# Patient Record
Sex: MALE | Race: WHITE | NOT HISPANIC OR LATINO | Employment: OTHER | URBAN - NONMETROPOLITAN AREA
[De-identification: names, ages, dates, MRNs, and addresses within clinical notes are randomized per-mention and may not be internally consistent; named-entity substitution may affect disease eponyms.]

---

## 2023-12-28 ENCOUNTER — EVALUATION (OUTPATIENT)
Dept: PHYSICAL THERAPY | Facility: CLINIC | Age: 73
End: 2023-12-28
Payer: OTHER GOVERNMENT

## 2023-12-28 DIAGNOSIS — M25.562 LEFT KNEE PAIN: Primary | ICD-10-CM

## 2023-12-28 DIAGNOSIS — Z96.659 TOTAL KNEE REPLACEMENT STATUS: ICD-10-CM

## 2023-12-28 PROCEDURE — 97110 THERAPEUTIC EXERCISES: CPT | Mod: GP

## 2023-12-28 PROCEDURE — 97161 PT EVAL LOW COMPLEX 20 MIN: CPT | Mod: GP

## 2023-12-28 ASSESSMENT — PATIENT HEALTH QUESTIONNAIRE - PHQ9
2. FEELING DOWN, DEPRESSED OR HOPELESS: NOT AT ALL
SUM OF ALL RESPONSES TO PHQ9 QUESTIONS 1 AND 2: 0
1. LITTLE INTEREST OR PLEASURE IN DOING THINGS: NOT AT ALL

## 2023-12-28 ASSESSMENT — PAIN SCALES - GENERAL: PAINLEVEL_OUTOF10: 0 - NO PAIN

## 2023-12-28 ASSESSMENT — ENCOUNTER SYMPTOMS
DEPRESSION: 0
OCCASIONAL FEELINGS OF UNSTEADINESS: 0
LOSS OF SENSATION IN FEET: 0

## 2023-12-28 ASSESSMENT — PAIN - FUNCTIONAL ASSESSMENT: PAIN_FUNCTIONAL_ASSESSMENT: 0-10

## 2023-12-28 NOTE — Clinical Note
December 28, 2023    Raymond Ramos MD  [address] [facility] [address]    Patient: Dorian Gilbert   YOB: 1950   Date of Visit: 12/28/2023       Dear Raymond Ramos Md  [address] [place] [address]    The attached plan of care is being sent to you because your patient’s medical reimbursement requires that you certify the plan of care. Your signature is required to allow uninterrupted insurance coverage.      You may indicate your approval by signing below and faxing this form back to us at Dept Fax: [phone].    Please call Dept: [phone] with any questions or concerns.    Thank you for this referral,        Lizzeth Russo, PT  [address] [address]    Payer: Payor: [facility] / Plan: [facility] / Product Type: *No Product type* /                                                                         Date:     Dear Lizzeth Russo, PT,     Re: Mr. Dorian Gilbert, MRN:[id number]    I certify that I have reviewed the attached plan of care and it is medically necessary for Mr. Dorian Gilbert (1950) who is under my care.          ______________________________________                    _________________  Provider name and credentials                                           Date and time                                                                                           Plan of Care 12/28/23   Effective from: 12/28/2023  Effective to: 2/2/2024    Plan ID: [id number]            Participants as of Finalize on 12/28/2023    Name Type Comments Contact Info    Raymond Ramos MD Referring Provider  [phone]    Lizzeth Russo, PT Physical Therapist  [phone]       Last Plan Note     Author: Lizzeth Russo PT Status: Addendum Last edited: 12/28/2023  9:45 AM       Physical Therapy    Physical Therapy Evaluation and Treatment       Patient Name: Dorian Gilbert  MRN: [id number]  Today's Date: 12/28/2023  Time Calculation  Start Time: 0945  Stop Time: 1024  Time Calculation (min): 39 min    Assessment:    Dorian Gilbert, a 73 y.o. male, arrives to outpatient PT s/p L TKR. Patient presenting with mild deficits in L knee AROM/musculature strength compared to R knee as well as mild functional mobility deficits per LEFS score. 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 Patient would benefit from skilled PT interventions 2x/week for 3 weeks for 6 total visits in POC to address L knee stability and strength. Educated in closed chain L knee flexion stretch as well as hip strengthening to progress home program from home care. HEP handout provided. 0/10 pain at end of session. Thank you for this referral and please call [phone] with any questions or concerns.      Plan:  OP PT Plan  Treatment/Interventions: Education/ Instruction, Therapeutic exercises, Blood flow restriction therapy, Cryotherapy, Gait training, Manual therapy, Taping techniques, Therapeutic activities  PT Plan: Skilled PT  PT Frequency: 2 times per week  Duration: 3 weeks for 6 additional visits in POC; The physical therapist of record is the therapist who assumes primary responsibility for patient management and as such is held accountable for the coordination, continuation and progression of the POC. This patient's care and PT of record will be transferred from Lizzeth Russo PT, DPT to Maria Ines Valdez PT effective as of 12/28/2023  Onset Date: 12/06/23  Certification Period Start Date: 09/05/23  Certification Period End Date: 03/24/24  Number of Treatments Authorized: 15  Rehab Potential: Good  Plan of Care Agreement: Patient    Current Problem:   1. Left knee pain  Follow Up In Physical Therapy      2. Total knee replacement status  Referral to Physical Therapy    Follow Up In Physical Therapy           Subjective    General  Reason for Referral: L TKR  Referred By: Richard WILD  General: Patient had L TKR on 12/6/23. Patient has follow-up with surgeon this afternoon. No current pain. No use of assistive device. Had home care physical therapy for 2 weeks with ROM from 0-116 deg. 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 Patient reports that home care PT believed his stair climbing was amazing. He is currently wearing full length compression stockings and jeans this date to PT. Compliant in HEP for R knee stretches as well as strengthening. He states that with his THR that he did not have PT afterwards. 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    No barriers to care or learning  Medical History Questionnaire reviewed with patient  Precautions:  Precautions  STEADI Fall Risk Score (The score of 4 or more indicates an increased risk of falling): 0  Precautions Comment: L TKR 12/6/2023    Pain:  Pain Assessment  Pain Assessment: 0-10  Pain Score: 0 - No pain  Home Living:   No concerns with home set-up  Prior Level of Function:   Independent in all ADLs/iADLs with no restriction    Objective     HIP  Specific Lower Extremity MMT  L Iliopsoas: (5/5): 5  L Gluteals (prone): (5/5): 4+  L Gluteals (sidelying): (5/5): 4+  L knee flexion: (5/5): 5  L knee extension: (5/5): 5  Gait  Gait Comment: Symmetric gait pattern  Flexability  L hamstrings: No restriction    Knee AROM  R knee flexion: (140°): 125  L knee flexion: (140°): 105 (Can reach 116 deg of flexion after fwd lunge stretch)  R knee extension: (0°): 0  L knee extension: (0°): 0    Outcome Measures:  Other Measures  Lower Extremity Funtional Score (LEFS): 73/80     Treatments:  Therapeutic Exercise  Therapeutic Exercise Performed: Yes  SLR x10  Fwd lunge at 4 in step L LE leading x10  Verbal review of L knee extension stretch  Attempt of L hamstring stretch but held due to no stretch  Standing hip  flex/ext/abduction x10 each LE each direction  Education on POC, therapeutic dosage, importance of continuation of stretches    EDUCATION:  Outpatient Education  Individual(s) Educated: Patient  Education Provided: Home Exercise Program, POC  Risk and Benefits Discussed with Patient/Caregiver/Other: yes  Patient/Caregiver Demonstrated Understanding: yes  Plan of Care Discussed and Agreed Upon: yes  Patient Response to Education: Patient/Caregiver Verbalized Understanding of Information, Patient/Caregiver Performed Return Demonstration of Exercises/Activities, Patient/Caregiver Asked Appropriate Questions  Education Comment: Access Code: [email or web address]  URL: [email or web address]  Date: 12/28/2023  Prepared by: Lizzeth Russo    Exercises  - Supine Heel Slide with Strap  - 1 x daily - 7 x weekly - 1-2 sets - 10 reps  - Supine Knee Extension Stretch on Towel Roll  - 1 x daily - 7 x weekly - 1-2 sets - 10 reps  - Lunge with Counter Support  - 1 x daily - 7 x weekly - 1-2 sets - 10 reps  - Supine Active Straight Leg Raise  - 1 x daily - 7 x weekly - 1-2 sets - 10 reps  - Standing Hip Abduction with Counter Support  - 1 x daily - 7 x weekly - 1-2 sets - 10 reps  - Standing Hip Extension with Counter Support  - 1 x daily - 7 x weekly - 1-2 sets - 10 reps  - Standing Hip Flexion with Counter Support  - 1 x daily - 7 x weekly - 1-2 sets - 10 reps    Goals  Increase in LEFS score to 80/80 points to improve ease with participating in ADLs/iADLs -Week 3  Increase in L knee AROM 0-120 painfree 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

## 2023-12-28 NOTE — PROGRESS NOTES
Physical Therapy    Physical Therapy Evaluation and Treatment      Patient Name: Dorian Gilbert  MRN: [id number]  Today's Date: 12/28/2023  Time Calculation  Start Time: 0945  Stop Time: 1024  Time Calculation (min): 39 min    Assessment:    Dorian Gilbert, a 73 y.o. male, arrives to outpatient PT s/p L TKR. Patient presenting with mild deficits in L knee AROM/musculature strength compared to R knee as well as mild functional mobility deficits per LEFS score. 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 Patient would benefit from skilled PT interventions 2x/week for 3 weeks for 6 total visits in POC to address L knee stability and strength. Educated in closed chain L knee flexion stretch as well as hip strengthening to progress home program from home care. HEP handout provided. 0/10 pain at end of session. Thank you for this referral and please call [phone] with any questions or concerns.      Plan:  OP PT Plan  Treatment/Interventions: Education/ Instruction, Therapeutic exercises, Blood flow restriction therapy, Cryotherapy, Gait training, Manual therapy, Taping techniques, Therapeutic activities  PT Plan: Skilled PT  PT Frequency: 2 times per week  Duration: 3 weeks for 6 additional visits in POC; The physical therapist of record is the therapist who assumes primary responsibility for patient management and as such is held accountable for the coordination, continuation and progression of the POC. This patient's care and PT of record will be transferred from Lizzeth Russo PT, DPT to Maria Ines Valdez PT effective as of 12/28/2023  Onset Date: 12/06/23  Certification Period Start Date: 09/05/23  Certification Period End Date: 03/24/24  Number of Treatments Authorized: 15  Rehab Potential: Good  Plan of Care Agreement: Patient    Current Problem:   1. Left knee pain  Follow Up In Physical Therapy      2. Total knee replacement status  Referral to  Physical Therapy    Follow Up In Physical Therapy          Subjective    General  Reason for Referral: L TKR  Referred By: Richard WILD  General: Patient had L TKR on 12/6/23. Patient has follow-up with surgeon this afternoon. No current pain. No use of assistive device. Had home care physical therapy for 2 weeks with ROM from 0-116 deg. 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 Patient reports that home care PT believed his stair climbing was amazing. He is currently wearing full length compression stockings and jeans this date to PT. Compliant in HEP for R knee stretches as well as strengthening. He states that with his THR that he did not have PT afterwards. 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    No barriers to care or learning  Medical History Questionnaire reviewed with patient  Precautions:  Precautions  STEADI Fall Risk Score (The score of 4 or more indicates an increased risk of falling): 0  Precautions Comment: L TKR 12/6/2023    Pain:  Pain Assessment  Pain Assessment: 0-10  Pain Score: 0 - No pain  Home Living:   No concerns with home set-up  Prior Level of Function:   Independent in all ADLs/iADLs with no restriction    Objective     HIP  Specific Lower Extremity MMT  L Iliopsoas: (5/5): 5  L Gluteals (prone): (5/5): 4+  L Gluteals (sidelying): (5/5): 4+  L knee flexion: (5/5): 5  L knee extension: (5/5): 5  Gait  Gait Comment: Symmetric gait pattern  Flexability  L hamstrings: No restriction    Knee AROM  R knee flexion: (140°): 125  L knee flexion: (140°): 105 (Can reach 116 deg of flexion after fwd lunge stretch)  R knee extension: (0°): 0  L knee extension: (0°): 0    Outcome Measures:  Other Measures  Lower Extremity Funtional Score (LEFS): 73/80     Treatments:  Therapeutic Exercise  Therapeutic Exercise Performed: Yes  SLR x10  Fwd lunge at 4 in step L LE leading x10  Verbal review of L knee extension stretch  Attempt of L hamstring  stretch but held due to no stretch  Standing hip flex/ext/abduction x10 each LE each direction  Education on POC, therapeutic dosage, importance of continuation of stretches    EDUCATION:  Outpatient Education  Individual(s) Educated: Patient  Education Provided: Home Exercise Program, POC  Risk and Benefits Discussed with Patient/Caregiver/Other: yes  Patient/Caregiver Demonstrated Understanding: yes  Plan of Care Discussed and Agreed Upon: yes  Patient Response to Education: Patient/Caregiver Verbalized Understanding of Information, Patient/Caregiver Performed Return Demonstration of Exercises/Activities, Patient/Caregiver Asked Appropriate Questions  Education Comment: Access Code: [email or web address]  URL: [email or web address]  Date: 12/28/2023  Prepared by: Lizzeth Russo    Exercises  - Supine Heel Slide with Strap  - 1 x daily - 7 x weekly - 1-2 sets - 10 reps  - Supine Knee Extension Stretch on Towel Roll  - 1 x daily - 7 x weekly - 1-2 sets - 10 reps  - Lunge with Counter Support  - 1 x daily - 7 x weekly - 1-2 sets - 10 reps  - Supine Active Straight Leg Raise  - 1 x daily - 7 x weekly - 1-2 sets - 10 reps  - Standing Hip Abduction with Counter Support  - 1 x daily - 7 x weekly - 1-2 sets - 10 reps  - Standing Hip Extension with Counter Support  - 1 x daily - 7 x weekly - 1-2 sets - 10 reps  - Standing Hip Flexion with Counter Support  - 1 x daily - 7 x weekly - 1-2 sets - 10 reps    Goals  Increase in LEFS score to 80/80 points to improve ease with participating in ADLs/iADLs -Week 3  Increase in L knee AROM 0-120 painfree 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

## 2023-12-28 NOTE — LETTER
December 28, 2023        Patient: Dorian Gilbert   YOB: 1950   Date of Visit: 12/28/2023       Dear Raymond Ramos Md  [address] [place] [address]    The attached plan of care is being sent to you because your patient’s medical reimbursement requires that you certify the plan of care. Your signature is required to allow uninterrupted insurance coverage.      You may indicate your approval by signing below and faxing this form back to us at Dept Fax: [phone].    X____________________________________________________________________    Please call Dept: [phone] with any questions or concerns.    Thank you for this referral,        Lizzeth Russo PT  [address] [address]    Payer: Payor: [facility] / Plan: [facility] / Product Type: *No Product type* /                                                                         Date:     Dear Lizzeth Russo PT,     Re: Mr. Dorian Gilbert, MRN:[id number]    I certify that I have reviewed the attached plan of care and it is medically necessary for Mr. Dorian Gilbert (1950) who is under my care.          ______________________________________                    _________________  Provider name and credentials                                           Date and time                                                                                           Plan of Care 12/28/23   Effective from: 12/28/2023  Effective to: 2/2/2024    Plan ID: [id number]                Participants as of Finalize on 12/28/2023      Name Type Comments Contact Info    Raymond Ramos MD Referring Provider  [phone]    Lizzeth Russo PT Physical Therapist  [phone]           Last Plan Note       Author: Lizzeth L Biclawski, PT Status: Addendum Last edited: 12/28/2023  9:45 AM         Physical Therapy    Physical Therapy Evaluation and  Treatment      Patient Name: Dorian Gilbert  MRN: [id number]  Today's Date: 12/28/2023  Time Calculation  Start Time: 0945  Stop Time: 1024  Time Calculation (min): 39 min    Assessment:    Dorian Gilbert, a 73 y.o. male, arrives to outpatient PT s/p L TKR. Patient presenting with mild deficits in L knee AROM/musculature strength compared to R knee as well as mild functional mobility deficits per LEFS score. 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 Patient would benefit from skilled PT interventions 2x/week for 3 weeks for 6 total visits in POC to address L knee stability and strength. Educated in closed chain L knee flexion stretch as well as hip strengthening to progress home program from home care. HEP handout provided. 0/10 pain at end of session. Thank you for this referral and please call [phone] with any questions or concerns.      Plan:  OP PT Plan  Treatment/Interventions: Education/ Instruction, Therapeutic exercises, Blood flow restriction therapy, Cryotherapy, Gait training, Manual therapy, Taping techniques, Therapeutic activities  PT Plan: Skilled PT  PT Frequency: 2 times per week  Duration: 3 weeks for 6 additional visits in POC; The physical therapist of record is the therapist who assumes primary responsibility for patient management and as such is held accountable for the coordination, continuation and progression of the POC. This patient's care and PT of record will be transferred from Lizzeth Russo PT, DPT to Maria Ines Valdez PT effective as of 12/28/2023  Onset Date: 12/06/23  Certification Period Start Date: 09/05/23  Certification Period End Date: 03/24/24  Number of Treatments Authorized: 15  Rehab Potential: Good  Plan of Care Agreement: Patient    Current Problem:   1. Left knee pain  Follow Up In Physical Therapy      2. Total knee replacement status  Referral to Physical Therapy    Follow Up In Physical Therapy           Subjective    General  Reason for Referral: L TKR  Referred By: Richard WILD  General: Patient had L TKR on 12/6/23. Patient has follow-up with surgeon this afternoon. No current pain. No use of assistive device. Had home care physical therapy for 2 weeks with ROM from 0-116 deg. 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 Patient reports that home care PT believed his stair climbing was amazing. He is currently wearing full length compression stockings and jeans this date to PT. Compliant in HEP for R knee stretches as well as strengthening. He states that with his THR that he did not have PT afterwards. 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    No barriers to care or learning  Medical History Questionnaire reviewed with patient  Precautions:  Precautions  STEADI Fall Risk Score (The score of 4 or more indicates an increased risk of falling): 0  Precautions Comment: L TKR 12/6/2023    Pain:  Pain Assessment  Pain Assessment: 0-10  Pain Score: 0 - No pain  Home Living:   No concerns with home set-up  Prior Level of Function:   Independent in all ADLs/iADLs with no restriction    Objective     HIP  Specific Lower Extremity MMT  L Iliopsoas: (5/5): 5  L Gluteals (prone): (5/5): 4+  L Gluteals (sidelying): (5/5): 4+  L knee flexion: (5/5): 5  L knee extension: (5/5): 5  Gait  Gait Comment: Symmetric gait pattern  Flexability  L hamstrings: No restriction    Knee AROM  R knee flexion: (140°): 125  L knee flexion: (140°): 105 (Can reach 116 deg of flexion after fwd lunge stretch)  R knee extension: (0°): 0  L knee extension: (0°): 0    Outcome Measures:  Other Measures  Lower Extremity Funtional Score (LEFS): 73/80     Treatments:  Therapeutic Exercise  Therapeutic Exercise Performed: Yes  SLR x10  Fwd lunge at 4 in step L LE leading x10  Verbal review of L knee extension stretch  Attempt of L hamstring stretch but held due to no stretch  Standing hip  flex/ext/abduction x10 each LE each direction  Education on POC, therapeutic dosage, importance of continuation of stretches    EDUCATION:  Outpatient Education  Individual(s) Educated: Patient  Education Provided: Home Exercise Program, POC  Risk and Benefits Discussed with Patient/Caregiver/Other: yes  Patient/Caregiver Demonstrated Understanding: yes  Plan of Care Discussed and Agreed Upon: yes  Patient Response to Education: Patient/Caregiver Verbalized Understanding of Information, Patient/Caregiver Performed Return Demonstration of Exercises/Activities, Patient/Caregiver Asked Appropriate Questions  Education Comment: Access Code: [email or web address]  URL: [email or web address]  Date: 12/28/2023  Prepared by: Lizzeth Russo    Exercises  - Supine Heel Slide with Strap  - 1 x daily - 7 x weekly - 1-2 sets - 10 reps  - Supine Knee Extension Stretch on Towel Roll  - 1 x daily - 7 x weekly - 1-2 sets - 10 reps  - Lunge with Counter Support  - 1 x daily - 7 x weekly - 1-2 sets - 10 reps  - Supine Active Straight Leg Raise  - 1 x daily - 7 x weekly - 1-2 sets - 10 reps  - Standing Hip Abduction with Counter Support  - 1 x daily - 7 x weekly - 1-2 sets - 10 reps  - Standing Hip Extension with Counter Support  - 1 x daily - 7 x weekly - 1-2 sets - 10 reps  - Standing Hip Flexion with Counter Support  - 1 x daily - 7 x weekly - 1-2 sets - 10 reps    Goals  Increase in LEFS score to 80/80 points to improve ease with participating in ADLs/iADLs -Week 3  Increase in L knee AROM 0-120 painfree 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

## 2023-12-28 NOTE — LETTER
December 28, 2023    Raymond Ramos MD  [address] [place] [address]    Patient: Dorian Gilbert   YOB: 1950   Date of Visit: 12/28/2023       Dear Raymond Ramos Md  [address] [facility] [place] [address]    The attached plan of care is being sent to you because your patient’s medical reimbursement requires that you certify the plan of care. Your signature is required to allow uninterrupted insurance coverage.      You may indicate your approval by signing below and faxing this form back to us at Dept Fax: [phone].    Please call Dept: [phone] with any questions or concerns.    Thank you for this referral,        Lizzeth Russo, PT  [address] [address]    Payer: Payor: [facility] / Plan: [facility] / Product Type: *No Product type* /                                                                         Date:     Dear Lizzeth Russo, PT,     Re: Mr. Dorian Gilbert, MRN:[id number]    I certify that I have reviewed the attached plan of care and it is medically necessary for Mr. Dorian Gilbert (1950) who is under my care.          ______________________________________                    _________________  Provider name and credentials                                           Date and time                                                                                           Plan of Care 12/28/23   Effective from: 12/28/2023  Effective to: 2/2/2024    Plan ID: [id number]            Participants as of Finalize on 12/28/2023    Name Type Comments Contact Info    Raymond Ramos MD Referring Provider  [phone]    Lizzeth Russo, PT Physical Therapist  [phone]       Last Plan Note     Author: Lizzeth Russo PT Status: Signed Last edited: 12/28/2023  9:45 AM       Physical Therapy    Physical Therapy Evaluation and Treatment       Patient Name: Dorian Gilbert  MRN: [id number]  Today's Date: 12/28/2023  Time Calculation  Start Time: 0945  Stop Time: 1024  Time Calculation (min): 39 min    Assessment:    Dorian Gilbert, a 73 y.o. male, arrives to outpatient PT s/p L TKR. Patient presenting with mild deficits in L knee AROM/musculature strength compared to R knee as well as mild functional mobility deficits per LEFS score. However, patient reports no functional deficits with ADLs/iADLs and no pain. He is independent with ambulation, transfers, and completing household/farm duties. Discussed with patient benefit of PT but patient would like to attempt HEP at this time and PT is agreeable to current plan. Did discuss with patient to follow up with surgeon's office today for evaluation of progress since surgery with patient verbalizing agreement. The pt has a good prognosis when considering positive factors including age and no current pain with barriers such as ongoing difficulty with L knee prior to surgical intervention (however patient reporting all impairments have resolved since surgery). The pt verbalized understanding and agreement to goals and POC. Educated on CKC flexion stretch and therapeutic dosage to maintain ROM of L knee. Thank you for this referral and please call [phone] with any questions or concerns.    Pt is being placed on hold for 30 days to attempt performing their home exercise program independently. Pt instructed to contact with any problems, questions, or adjustments. This will serve as the patient's discharge if they elect not to resume skilled PT within 30 days.     Plan:  OP PT Plan  Treatment/Interventions: Education/ Instruction, Therapeutic exercises  PT Plan: Initial Assessment and Treatment only  PT Frequency: Other (Comment)  Onset Date: 12/06/23  Certification Period Start Date: 09/05/23  Certification Period End Date: 03/24/24  Number of Treatments Authorized: 15  Rehab Potential: Good  Plan of Care  Agreement: Patient    Current Problem:   1. Left knee pain  Follow Up In Physical Therapy      2. Total knee replacement status  Referral to Physical Therapy    Follow Up In Physical Therapy          Subjective    General  Reason for Referral: L TKR  Referred By: Richard WILD  General: Patient had L TKR on 12/6/23. Patient has follow-up with surgeon this afternoon. No current pain. No use of assistive device. Had home care physical therapy for 2 weeks with ROM from 0-116 deg. 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 Patient reports that home care PT believed his stair climbing was amazing. He is currently wearing full length compression stockings and jeans this date to PT. Compliant in HEP for R knee stretches as well as strengthening. He states that with his THR that he did not have PT afterwards. 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    No barriers to care or learning  Medical History Questionnaire reviewed with patient  Precautions:  Precautions  STEADI Fall Risk Score (The score of 4 or more indicates an increased risk of falling): 0  Precautions Comment: L TKR 12/6/2023    Pain:  Pain Assessment  Pain Assessment: 0-10  Pain Score: 0 - No pain  Home Living:   No concerns with home set-up  Prior Level of Function:   Independent in all ADLs/iADLs with no restriction    Objective     HIP  Specific Lower Extremity MMT  L Iliopsoas: (5/5): 5  L Gluteals (prone): (5/5): 4+  L Gluteals (sidelying): (5/5): 4+  L knee flexion: (5/5): 5  L knee extension: (5/5): 5  Gait  Gait Comment: Symmetric gait pattern  Flexability  L hamstrings: No restriction    Knee AROM  R knee flexion: (140°): 125  L knee flexion: (140°): 105 (Can reach 116 deg of flexion after fwd lunge stretch)  R knee extension: (0°): 0  L knee extension: (0°): 0    Outcome Measures:  Other Measures  Lower Extremity Funtional Score (LEFS): 73/80     Treatments:  Therapeutic Exercise  Therapeutic  Exercise Performed: Yes  SLR x10  Fwd lunge at 4 in step L LE leading x10  Verbal review of L knee extension stretch  Attempt of L hamstring stretch but held due to no stretch  Standing hip flex/ext/abduction x10 each LE each direction  Education on POC, therapeutic dosage, importance of continuation of stretches    EDUCATION:  Outpatient Education  Individual(s) Educated: Patient  Education Provided: Home Exercise Program, POC  Risk and Benefits Discussed with Patient/Caregiver/Other: yes  Patient/Caregiver Demonstrated Understanding: yes  Plan of Care Discussed and Agreed Upon: yes  Patient Response to Education: Patient/Caregiver Verbalized Understanding of Information, Patient/Caregiver Performed Return Demonstration of Exercises/Activities, Patient/Caregiver Asked Appropriate Questions  Education Comment: Access Code: [email or web address]  URL: [email or web address]  Date: 12/28/2023  Prepared by: Lizzeth Russo    Exercises  - Supine Heel Slide with Strap  - 1 x daily - 7 x weekly - 1-2 sets - 10 reps  - Supine Knee Extension Stretch on Towel Roll  - 1 x daily - 7 x weekly - 1-2 sets - 10 reps  - Lunge with Counter Support  - 1 x daily - 7 x weekly - 1-2 sets - 10 reps  - Supine Active Straight Leg Raise  - 1 x daily - 7 x weekly - 1-2 sets - 10 reps  - Standing Hip Abduction with Counter Support  - 1 x daily - 7 x weekly - 1-2 sets - 10 reps  - Standing Hip Extension with Counter Support  - 1 x daily - 7 x weekly - 1-2 sets - 10 reps  - Standing Hip Flexion with Counter Support  - 1 x daily - 7 x weekly - 1-2 sets - 10 reps           Current Participants as of 12/28/2023    Name Type Comments Contact Info    Raymond Ramos MD Referring Provider  [phone]    Signature pending    Lizzeth Russo, PT Physical Therapist  [phone]

## 2023-12-28 NOTE — LETTER
December 28, 2023      Patient: Dorian Gilbert   YOB: 1950   Date of Visit: 12/28/2023       Dear Raymond Ramos Md  [address] [place] [address]    The attached plan of care is being sent to you because your patient’s medical reimbursement requires that you certify the plan of care. Your signature is required to allow uninterrupted insurance coverage.      You may indicate your approval by signing below and faxing this form back to us at Dept Fax: [phone].      X___________________________________________________________________    Please call Dept: [phone] with any questions or concerns.    Thank you for this referral,        Lizzeth Russo, PT  [address] [address]    Payer: Payor: [facility] / Plan: [facility] / Product Type: *No Product type* /                                                                         Date:     Dear Lizzeth Russo, PT,     Re: Mr. Dorian Gilbert, MRN:[id number]    I certify that I have reviewed the attached plan of care and it is medically necessary for Mr. Dorian Gilbert (1950) who is under my care.          ______________________________________                    _________________  Provider name and credentials                                           Date and time                                                                                           Plan of Care 12/28/23   Effective from: 12/28/2023  Effective to: 2/2/2024    Plan ID: [id number]                Participants as of Finalize on 12/28/2023      Name Type Comments Contact Info    Raymond Ramos MD Referring Provider  [phone]    Lizzeth Russo PT Physical Therapist  [phone]           Last Plan Note       Author: Lizzeth L Biclawski, PT Status: Signed Last edited: 12/28/2023  9:45 AM         Physical Therapy    Physical Therapy Evaluation and  Treatment      Patient Name: Dorian Gilbert  MRN: [id number]  Today's Date: 12/28/2023  Time Calculation  Start Time: 0945  Stop Time: 1024  Time Calculation (min): 39 min    Assessment:    Dorian Gilbert, a 73 y.o. male, arrives to outpatient PT s/p L TKR. Patient presenting with mild deficits in L knee AROM/musculature strength compared to R knee as well as mild functional mobility deficits per LEFS score. However, patient reports no functional deficits with ADLs/iADLs and no pain. He is independent with ambulation, transfers, and completing household/farm duties. Discussed with patient benefit of PT but patient would like to attempt HEP at this time and PT is agreeable to current plan. Did discuss with patient to follow up with surgeon's office today for evaluation of progress since surgery with patient verbalizing agreement. The pt has a good prognosis when considering positive factors including age and no current pain with barriers such as ongoing difficulty with L knee prior to surgical intervention (however patient reporting all impairments have resolved since surgery). The pt verbalized understanding and agreement to goals and POC. Educated on CKC flexion stretch and therapeutic dosage to maintain ROM of L knee. Thank you for this referral and please call [phone] with any questions or concerns.    Pt is being placed on hold for 30 days to attempt performing their home exercise program independently. Pt instructed to contact with any problems, questions, or adjustments. This will serve as the patient's discharge if they elect not to resume skilled PT within 30 days.     Plan:  OP PT Plan  Treatment/Interventions: Education/ Instruction, Therapeutic exercises  PT Plan: Initial Assessment and Treatment only  PT Frequency: Other (Comment)  Onset Date: 12/06/23  Certification Period Start Date: 09/05/23  Certification Period End Date: 03/24/24  Number of Treatments Authorized: 15  Rehab Potential: Good  Plan of  Care Agreement: Patient    Current Problem:   1. Left knee pain  Follow Up In Physical Therapy      2. Total knee replacement status  Referral to Physical Therapy    Follow Up In Physical Therapy          Subjective    General  Reason for Referral: L TKR  Referred By: Richard WILD  General: Patient had L TKR on 12/6/23. Patient has follow-up with surgeon this afternoon. No current pain. No use of assistive device. Had home care physical therapy for 2 weeks with ROM from 0-116 deg. 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 Patient reports that home care PT believed his stair climbing was amazing. He is currently wearing full length compression stockings and jeans this date to PT. Compliant in HEP for R knee stretches as well as strengthening. He states that with his THR that he did not have PT afterwards. 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    No barriers to care or learning  Medical History Questionnaire reviewed with patient  Precautions:  Precautions  STEADI Fall Risk Score (The score of 4 or more indicates an increased risk of falling): 0  Precautions Comment: L TKR 12/6/2023    Pain:  Pain Assessment  Pain Assessment: 0-10  Pain Score: 0 - No pain  Home Living:   No concerns with home set-up  Prior Level of Function:   Independent in all ADLs/iADLs with no restriction    Objective     HIP  Specific Lower Extremity MMT  L Iliopsoas: (5/5): 5  L Gluteals (prone): (5/5): 4+  L Gluteals (sidelying): (5/5): 4+  L knee flexion: (5/5): 5  L knee extension: (5/5): 5  Gait  Gait Comment: Symmetric gait pattern  Flexability  L hamstrings: No restriction    Knee AROM  R knee flexion: (140°): 125  L knee flexion: (140°): 105 (Can reach 116 deg of flexion after fwd lunge stretch)  R knee extension: (0°): 0  L knee extension: (0°): 0    Outcome Measures:  Other Measures  Lower Extremity Funtional Score (LEFS): 73/80     Treatments:  Therapeutic Exercise  Therapeutic  Exercise Performed: Yes  SLR x10  Fwd lunge at 4 in step L LE leading x10  Verbal review of L knee extension stretch  Attempt of L hamstring stretch but held due to no stretch  Standing hip flex/ext/abduction x10 each LE each direction  Education on POC, therapeutic dosage, importance of continuation of stretches    EDUCATION:  Outpatient Education  Individual(s) Educated: Patient  Education Provided: Home Exercise Program, POC  Risk and Benefits Discussed with Patient/Caregiver/Other: yes  Patient/Caregiver Demonstrated Understanding: yes  Plan of Care Discussed and Agreed Upon: yes  Patient Response to Education: Patient/Caregiver Verbalized Understanding of Information, Patient/Caregiver Performed Return Demonstration of Exercises/Activities, Patient/Caregiver Asked Appropriate Questions  Education Comment: Access Code: [email or web address]  URL: [email or web address]  Date: 12/28/2023  Prepared by: Lizzeth Russo    Exercises  - Supine Heel Slide with Strap  - 1 x daily - 7 x weekly - 1-2 sets - 10 reps  - Supine Knee Extension Stretch on Towel Roll  - 1 x daily - 7 x weekly - 1-2 sets - 10 reps  - Lunge with Counter Support  - 1 x daily - 7 x weekly - 1-2 sets - 10 reps  - Supine Active Straight Leg Raise  - 1 x daily - 7 x weekly - 1-2 sets - 10 reps  - Standing Hip Abduction with Counter Support  - 1 x daily - 7 x weekly - 1-2 sets - 10 reps  - Standing Hip Extension with Counter Support  - 1 x daily - 7 x weekly - 1-2 sets - 10 reps  - Standing Hip Flexion with Counter Support  - 1 x daily - 7 x weekly - 1-2 sets - 10 reps           Current Participants as of 12/28/2023      Name Type Comments Contact Info    Raymond Ramos MD Referring Provider  [phone]    Signature pending    Lizzeth Russo, PT Physical Therapist  [phone]

## 2024-01-03 ENCOUNTER — TREATMENT (OUTPATIENT)
Dept: PHYSICAL THERAPY | Facility: CLINIC | Age: 74
End: 2024-01-03
Payer: OTHER GOVERNMENT

## 2024-01-03 DIAGNOSIS — M25.562 LEFT KNEE PAIN: ICD-10-CM

## 2024-01-03 DIAGNOSIS — Z96.659 TOTAL KNEE REPLACEMENT STATUS: ICD-10-CM

## 2024-01-03 PROCEDURE — 97110 THERAPEUTIC EXERCISES: CPT | Mod: GP,CQ

## 2024-01-03 ASSESSMENT — PAIN SCALES - GENERAL: PAINLEVEL_OUTOF10: 0 - NO PAIN

## 2024-01-03 ASSESSMENT — PAIN - FUNCTIONAL ASSESSMENT: PAIN_FUNCTIONAL_ASSESSMENT: 0-10

## 2024-01-03 NOTE — PROGRESS NOTES
Physical Therapy Treatment    Patient Name: Dorian Gilbert  MRN: [id number]  Today's Date: 1/3/2024  Time Calculation  Start Time: 1126  Stop Time: 1216  Time Calculation (min): 50 min      Assessment:   Patient identified by name and date of birth. Patient was able to progress with weights with standing exercises. He demonstrated moderate sway with finger support with addition of SLS. He presented with 0 degrees ext and degrees 121 flexion with AAROM.     Plan:  OP PT Plan  Treatment/Interventions: Education/ Instruction, Therapeutic exercises, Blood flow restriction therapy, Cryotherapy, Gait training, Manual therapy, Taping techniques, Therapeutic activities  PT Plan: Skilled PT  PT Frequency: 2 times per week  Duration: 3 weeks for 6 additional visits in POC; The physical therapist of record is the therapist who assumes primary responsibility for patient management and as such is held accountable for the coordination, continuation and progression of the POC. This patient's care and PT of record will be transferred from Lizzeth Russo PT, DPT to Maria Ines Valdez PT effective as of 12/28/2023  Onset Date: 12/06/23  Certification Period Start Date: 09/05/23  Certification Period End Date: 03/24/24  Number of Treatments Authorized: 15  Rehab Potential: Good  Plan of Care Agreement: Patient    Progress with strengthening and ROM for normalization of gait. AW    Current Problem  Problem List Items Addressed This Visit             ICD-10-CM    Left knee pain M25.562    Total knee replacement status Z96.659       Subjective  Patient reported compliance with % of the time. He reported 0/10 pain after treatment.   General  Reason for Referral: L TKR  Referred By: Richard WILD  Precautions  Precautions  Precautions Comment: L TKR 12/6/2023    Pain  Pain Assessment: 0-10  Pain Score: 0 - No pain     Treatments:  Therapeutic Exercise  Therapeutic Exercise Performed: Yes  Bike 5'   Slant board 2 x 1'  Step ups fwd 2x10  "(N)  Step ups lat 2x10 (N)  Standing Hip abd 2 x 10 2# (P)  Standing Hip ext 2 x 10 2# (P)  Standing marching 2 x 10 2# (P)  SLS 3 x 30\" finger support (N)  BOSU lunge 2 x 10 (N)  BOSU running man 2 x 30\"   Standing flexion stretch 2 x 30\" (N)  Seated LAQ 2 x 10 2#   Seated HSC 2 x 10 green band   Seated HS stretch (A)  SLR 2 x 10   Heel slide with strap x10      OP EDUCATION:   Access Code: [email or web address]  URL: [email or web address]  Date: 12/28/2023  Prepared by: Lizzeth Russo    Exercises  - Supine Heel Slide with Strap  - 1 x daily - 7 x weekly - 1-2 sets - 10 reps  - Supine Knee Extension Stretch on Towel Roll  - 1 x daily - 7 x weekly - 1-2 sets - 10 reps  - Lunge with Counter Support  - 1 x daily - 7 x weekly - 1-2 sets - 10 reps  - Supine Active Straight Leg Raise  - 1 x daily - 7 x weekly - 1-2 sets - 10 reps  - Standing Hip Abduction with Counter Support  - 1 x daily - 7 x weekly - 1-2 sets - 10 reps  - Standing Hip Extension with Counter Support  - 1 x daily - 7 x weekly - 1-2 sets - 10 reps  - Standing Hip Flexion with Counter Support  - 1 x daily - 7 x weekly - 1-2 sets - 10 reps     Goals:  Active       PT Problem       PT Goals       Start:  12/28/23    Expected End:  01/26/24       Increase in LEFS score to 80/80 points to improve ease with participating in ADLs/iADLs -Week 3  Increase in L knee AROM 0-120 painfree 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

## 2024-01-05 ENCOUNTER — TREATMENT (OUTPATIENT)
Dept: PHYSICAL THERAPY | Facility: CLINIC | Age: 74
End: 2024-01-05
Payer: OTHER GOVERNMENT

## 2024-01-05 DIAGNOSIS — Z96.659 TOTAL KNEE REPLACEMENT STATUS: ICD-10-CM

## 2024-01-05 DIAGNOSIS — M25.562 LEFT KNEE PAIN: ICD-10-CM

## 2024-01-05 PROCEDURE — 97110 THERAPEUTIC EXERCISES: CPT | Mod: GP,CQ

## 2024-01-05 ASSESSMENT — PAIN - FUNCTIONAL ASSESSMENT: PAIN_FUNCTIONAL_ASSESSMENT: 0-10

## 2024-01-05 ASSESSMENT — PAIN SCALES - GENERAL: PAINLEVEL_OUTOF10: 0 - NO PAIN

## 2024-01-05 NOTE — PROGRESS NOTES
Physical Therapy Treatment    Patient Name: Dorian Gilbert  MRN: [id number]  Today's Date: 1/5/2024  Time Calculation  Start Time: 1131  Stop Time: 1215  Time Calculation (min): 44 min      Assessment:Patient identified by name and date of birth.   Had patient complete step ambulation this date reciprocally since he was questioning the technique at home.   Minimal antalgic gait observed in the clinic today.   Mild LBP with SLR, so had patient flex the R knee which alleviated the symptoms.          Plan:    Plan to continue with progression of strength for improved ADL'S. JA    OP PT Plan  Treatment/Interventions: Education/ Instruction, Therapeutic exercises, Blood flow restriction therapy, Cryotherapy, Gait training, Manual therapy, Taping techniques, Therapeutic activities  PT Plan: Skilled PT  PT Frequency: 2 times per week  Duration: 3 weeks for 6 additional visits in POC; The physical therapist of record is the therapist who assumes primary responsibility for patient management and as such is held accountable for the coordination, continuation and progression of the POC. This patient's care and PT of record will be transferred from Lizzeth Russo PT, DPT to Maria Ines Valdez PT effective as of 12/28/2023  Onset Date: 12/06/23  Certification Period Start Date: 09/05/23  Certification Period End Date: 03/24/24  Number of Treatments Authorized: 15  Rehab Potential: Good  Plan of Care Agreement: Patient    Current Problem  Problem List Items Addressed This Visit             ICD-10-CM    Left knee pain M25.562    Total knee replacement status Z96.659       Subjective Patient is compliant with HEP completion with no issues to report at this time.  States that he is starting to sleep better at night.     Precautions  Precautions  Precautions Comment: L TKR 12/6/2023    Pain  Pain Assessment: 0-10  Pain Score: 0 - No pain    Treatments:   Therapeutic Exercise  Therapeutic Exercise Performed: Yes  Bike 5'   Slant board 2 x  "1'  Step ups fwd 2x10   Step ups lat 2x10   Standing Hip abd 2 x 10 2#   Standing Hip ext 2 x 10 2# (P)  Standing marching 2 x 10 2# (P)  SLS 3 x 30\" finger support   BOSU lunge 2 x 10   BOSU running man 2 x 30\"   Standing flexion stretch 2 x 30\"  Seated LAQ 2 x 10 2#   Seated HSC 2 x 10 green band   Seated HS stretch (N)   SLR 2 x 10   Heel slide with strap x10     OP EDUCATION:   Access Code: [email or web address]  URL: [email or web address]  Date: 12/28/2023    Prepared by: Lizzeth Russo    Exercises  - Supine Heel Slide with Strap  - 1 x daily - 7 x weekly - 1-2 sets - 10 reps  - Supine Knee Extension Stretch on Towel Roll  - 1 x daily - 7 x weekly - 1-2 sets - 10 reps  - Lunge with Counter Support  - 1 x daily - 7 x weekly - 1-2 sets - 10 reps  - Supine Active Straight Leg Raise  - 1 x daily - 7 x weekly - 1-2 sets - 10 reps  - Standing Hip Abduction with Counter Support  - 1 x daily - 7 x weekly - 1-2 sets - 10 reps  - Standing Hip Extension with Counter Support  - 1 x daily - 7 x weekly - 1-2 sets - 10 reps  - Standing Hip Flexion with Counter Support  - 1 x daily - 7 x weekly - 1-2 sets - 10 reps        Goals:  Active       PT Problem       PT Goals       Start:  12/28/23    Expected End:  01/26/24       Increase in LEFS score to 80/80 points to improve ease with participating in ADLs/iADLs -Week 3  Increase in L knee AROM 0-120 painfree 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

## 2024-01-09 ENCOUNTER — TREATMENT (OUTPATIENT)
Dept: PHYSICAL THERAPY | Facility: CLINIC | Age: 74
End: 2024-01-09
Payer: OTHER GOVERNMENT

## 2024-01-09 DIAGNOSIS — Z96.659 TOTAL KNEE REPLACEMENT STATUS: ICD-10-CM

## 2024-01-09 DIAGNOSIS — M25.562 LEFT KNEE PAIN: ICD-10-CM

## 2024-01-09 PROCEDURE — 97110 THERAPEUTIC EXERCISES: CPT | Mod: GP,CQ

## 2024-01-09 ASSESSMENT — PAIN - FUNCTIONAL ASSESSMENT: PAIN_FUNCTIONAL_ASSESSMENT: 0-10

## 2024-01-09 ASSESSMENT — PAIN SCALES - GENERAL: PAINLEVEL_OUTOF10: 0 - NO PAIN

## 2024-01-09 NOTE — PROGRESS NOTES
"Physical Therapy Treatment    Patient Name: Dorian Gilbert  MRN: [id number]  Today's Date: 1/9/2024  Time Calculation  Start Time: 0912  Stop Time: 1002  Time Calculation (min): 50 min      Assessment:Patient identified by name and date of birth.    Added new exercises to HEP with correct technique demonstrated and patient verbalizing understanding of instruction. (see below)   Cues given verbally and demo for correct form with standing hip  ABD d/t flexing at the trunk.   Knee flexion 117 degrees with strap    Plan:  Plan to continue with progression of LE strength as able for ease of home and community ADL's. JA       OP PT Plan  Treatment/Interventions: Education/ Instruction, Therapeutic exercises, Blood flow restriction therapy, Cryotherapy, Gait training, Manual therapy, Taping techniques, Therapeutic activities  PT Plan: Skilled PT  PT Frequency: 2 times per week  Duration: 3 weeks for 6 additional visits in POC; The physical therapist of record is the therapist who assumes primary responsibility for patient management and as such is held accountable for the coordination, continuation and progression of the POC. This patient's care and PT of record will be transferred from Lizzeth Russo PT, DPT to [facility] In[facility] PT effective as of 12/28/2023  Onset Date: 12/06/23  Certification Period Start Date: 09/05/23  Certification Period End Date: 03/24/24  Number of Treatments Authorized: 15  Rehab Potential: Good  Plan of Care Agreement: Patient    Current Problem  Problem List Items Addressed This Visit             ICD-10-CM    Left knee pain M25.562    Total knee replacement status Z96.659       Subjective Patient identified by name and date of birth.   Patient reports that he is not having any pain and feels good overall. Notes mild stiffness when getting out of bed in the mornings but once he \"gets going it isn't too bad.\" Notes he is ascending and descending stairs at home reciprocally.  " "      Precautions  Precautions  Precautions Comment: L TKR 12/6/2023    Pain  Pain Assessment: 0-10  Pain Score: 0 - No pain    Treatments:   Therapeutic Exercise  Bike 5'   Slant board 2 x 1'  Step ups fwd 2 x 10   Step ups lat 2x10   Standing Hip abd 2 x 10 2#   Standing Hip ext 2 x 10 2#   Standing marching 2 x 10 2#   Standing HS curl 2 x 10 2# (N)   SLS 3 x 30\" finger support   BOSU lunge 2 x 10   BOSU running man 2 x 30\"   Standing flexion stretch 2 x 30\"  Seated LAQ 2 x 10 2#   Seated HSC 2 x 10 green band   Seated HS stretch 3 x 30\"    SLR 2 x 10   Heel slide with strap x10     OP EDUCATION:  Access Code: [email or web address]  URL: [email or web address]  Date: 01/09/2024  Prepared by: Jeni Ojeda    Exercises  - Single Leg Stance  - 1 x daily - 7 x weekly - 2 sets - 10 reps  - Forward Step Up with Counter Support  - 1 x daily - 7 x weekly - 2 sets - 10 reps  - Lateral Step Up with Counter Support  - 1 x daily - 7 x weekly - 2 sets - 10 reps  - Standing Alternating Knee Flexion with Ankle Weights  - 1 x daily - 7 x weekly - 2 sets - 10 reps  - Seated Hamstring Stretch  - 1 x daily - 7 x weekly - 1 sets - 3 reps - 30 seconds  hold      Access Code: [email or web address]  URL: [email or web address]  Date: 12/28/2023    Prepared by: Lizzeth Russo    Exercises  - Supine Heel Slide with Strap  - 1 x daily - 7 x weekly - 1-2 sets - 10 reps  - Supine Knee Extension Stretch on Towel Roll  - 1 x daily - 7 x weekly - 1-2 sets - 10 reps  - Lunge with Counter Support  - 1 x daily - 7 x weekly - 1-2 sets - 10 reps  - Supine Active Straight Leg Raise  - 1 x daily - 7 x weekly - 1-2 sets - 10 reps  - Standing Hip Abduction with Counter Support  - 1 x daily - 7 x weekly - 1-2 sets - 10 reps  - Standing Hip Extension with Counter Support  - 1 x daily - 7 x weekly - 1-2 sets - 10 reps  - Standing Hip Flexion with Counter Support  - 1 x daily - 7 x weekly - 1-2 sets - 10 reps        Goals:  Active  "      PT Problem       PT Goals       Start:  12/28/23    Expected End:  01/26/24       Increase in LEFS score to 80/80 points to improve ease with participating in ADLs/iADLs -Week 3  Increase in L knee AROM 0-120 painfree 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

## 2024-01-10 ENCOUNTER — TREATMENT (OUTPATIENT)
Dept: PHYSICAL THERAPY | Facility: CLINIC | Age: 74
End: 2024-01-10
Payer: OTHER GOVERNMENT

## 2024-01-10 DIAGNOSIS — M25.562 LEFT KNEE PAIN: ICD-10-CM

## 2024-01-10 DIAGNOSIS — Z96.659 TOTAL KNEE REPLACEMENT STATUS: ICD-10-CM

## 2024-01-10 PROCEDURE — 97110 THERAPEUTIC EXERCISES: CPT | Mod: GP,CQ

## 2024-01-10 ASSESSMENT — PAIN - FUNCTIONAL ASSESSMENT: PAIN_FUNCTIONAL_ASSESSMENT: 0-10

## 2024-01-10 ASSESSMENT — PAIN SCALES - GENERAL: PAINLEVEL_OUTOF10: 2

## 2024-01-10 NOTE — PROGRESS NOTES
Physical Therapy Treatment    Patient Name: Dorian Gilbert  MRN: [id number]  Today's Date: 1/10/2024  Time Calculation  Start Time: 0915  Stop Time: 1005  Time Calculation (min): 50 min  Visits: 5/7    Assessment:   Patient identified by name and date of birth. Held progression of exercises this date due to report of increased Sx. He presented with increased edema this date reviewed use of ice and elevation.     Plan:  OP PT Plan  Treatment/Interventions: Education/ Instruction, Therapeutic exercises, Blood flow restriction therapy, Cryotherapy, Gait training, Manual therapy, Taping techniques, Therapeutic activities  PT Plan: Skilled PT  PT Frequency: 2 times per week  Duration: 3 weeks for 6 additional visits in POC; The physical therapist of record is the therapist who assumes primary responsibility for patient management and as such is held accountable for the coordination, continuation and progression of the POC. This patient's care and PT of record will be transferred from Lizzeth Russo PT, DPT to [facility] effective as of 12/28/2023  Onset Date: 12/06/23  Certification Period Start Date: 09/05/23  Certification Period End Date: 03/24/24  Number of Treatments Authorized: 15  Rehab Potential: Good  Plan of Care Agreement: Patient    Continue with progression of strengthening and ROM for increased ease and normalization of gait pattern.     Current Problem  Problem List Items Addressed This Visit             ICD-10-CM    Left knee pain M25.562    Total knee replacement status Z96.659       Subjective Patient reported he experienced increased Sx after his appointment the day prior that he applied ice throughout the evening and has continue mild Sx this AM. He reported 1/10 pain after treatment.    General  Reason for Referral: L TKR  Referred By: Richard WILD  Precautions  Precautions  Precautions Comment: L TKR 12/6/2023    Pain  Pain Assessment: 0-10  Pain Score: 2  Pain Location: Knee  Pain Orientation:  "Left     Treatments:  Therapeutic Exercise  Therapeutic Exercise Performed: Yes  Bike 5'   Slant board 2 x 1'  Step ups fwd 2x10   Step ups lat 2x10   Standing Hip abd 2 x 10 2#   Standing Hip ext 2 x 10 2#   Standing marching 2 x 10 2#   Standing HS curl 2 x 10 2#   SLS 3 x 30\" finger support   BOSU lunge 2 x 10   BOSU running man 2 x 30\"   Standing flexion stretch 2 x 30\"  Seated LAQ 2 x 10 2#   Seated HSC 2 x 10 green band   Seated HS stretch 3 x 30\"    SLR 2 x 10   Heel slide with strap x10     OP EDUCATION:   Access Code: [email or web address]  URL: [email or web address]  Date: 01/09/2024  Prepared by: Jeni Ojeda     Exercises  - Single Leg Stance  - 1 x daily - 7 x weekly - 2 sets - 10 reps  - Forward Step Up with Counter Support  - 1 x daily - 7 x weekly - 2 sets - 10 reps  - Lateral Step Up with Counter Support  - 1 x daily - 7 x weekly - 2 sets - 10 reps  - Standing Alternating Knee Flexion with Ankle Weights  - 1 x daily - 7 x weekly - 2 sets - 10 reps  - Seated Hamstring Stretch  - 1 x daily - 7 x weekly - 1 sets - 3 reps - 30 seconds  hold       Access Code: [email or web address]  URL: [email or web address]  Date: 12/28/2023    Prepared by: Lizzeth Russo    Exercises  - Supine Heel Slide with Strap  - 1 x daily - 7 x weekly - 1-2 sets - 10 reps  - Supine Knee Extension Stretch on Towel Roll  - 1 x daily - 7 x weekly - 1-2 sets - 10 reps  - Lunge with Counter Support  - 1 x daily - 7 x weekly - 1-2 sets - 10 reps  - Supine Active Straight Leg Raise  - 1 x daily - 7 x weekly - 1-2 sets - 10 reps  - Standing Hip Abduction with Counter Support  - 1 x daily - 7 x weekly - 1-2 sets - 10 reps  - Standing Hip Extension with Counter Support  - 1 x daily - 7 x weekly - 1-2 sets - 10 reps  - Standing Hip Flexion with Counter Support  - 1 x daily - 7 x weekly - 1-2 sets - 10 reps     Goals:  Active       PT Problem       PT Goals       Start:  12/28/23    Expected End:  01/26/24       " Increase in LEFS score to 80/80 points to improve ease with participating in ADLs/iADLs -Week 3  Increase in L knee AROM 0-120 painfree 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

## 2024-01-17 ENCOUNTER — TREATMENT (OUTPATIENT)
Dept: PHYSICAL THERAPY | Facility: CLINIC | Age: 74
End: 2024-01-17
Payer: OTHER GOVERNMENT

## 2024-01-17 DIAGNOSIS — M25.562 LEFT KNEE PAIN: ICD-10-CM

## 2024-01-17 DIAGNOSIS — Z96.659 TOTAL KNEE REPLACEMENT STATUS: ICD-10-CM

## 2024-01-17 PROCEDURE — 97110 THERAPEUTIC EXERCISES: CPT | Mod: GP,CQ

## 2024-01-17 ASSESSMENT — PAIN SCALES - GENERAL: PAINLEVEL_OUTOF10: 1

## 2024-01-17 ASSESSMENT — PAIN - FUNCTIONAL ASSESSMENT: PAIN_FUNCTIONAL_ASSESSMENT: 0-10

## 2024-01-17 NOTE — PROGRESS NOTES
Physical Therapy Treatment    Patient Name: Dorian Gilbert  MRN: [id number]  Today's Date: 1/17/2024  Time Calculation  Start Time: 1400  Stop Time: 1449  Time Calculation (min): 49 min      Assessment:Patient identified by name and date of birth.   Mildly antalgic gait observed in the clinic.   Fair eccentric control with step ups/downs.  1 hand support with SLS.   Progressed weight with standing PRE's.         Plan:  Plan to continue with progression of exercises for improved strength. JA    OP PT Plan  Treatment/Interventions: Education/ Instruction, Therapeutic exercises, Blood flow restriction therapy, Cryotherapy, Gait training, Manual therapy, Taping techniques, Therapeutic activities  PT Plan: Skilled PT  PT Frequency: 2 times per week  Duration: 3 weeks for 6 additional visits in POC; The physical therapist of record is the therapist who assumes primary responsibility for patient management and as such is held accountable for the coordination, continuation and progression of the POC. This patient's care and PT of record will be transferred from Lizzeth Russo PT, DPT to [facility] effective as of 12/28/2023  Onset Date: 12/06/23  Certification Period Start Date: 09/05/23  Certification Period End Date: 03/24/24  Number of Treatments Authorized: 15  Rehab Potential: Good  Plan of Care Agreement: Patient    Current Problem  Problem List Items Addressed This Visit             ICD-10-CM    Left knee pain M25.562    Total knee replacement status Z96.659       Subjective Patient is compliant with HEP completion with no issues to report at this time.  States that he is having more pain in his hip and than his knee. Notes he has not had to take pain pills in the last few weeks.     Precautions  Precautions  Precautions Comment: L TKR 12/6/2023    Pain  Pain Assessment: 0-10  Pain Score: 1  Pain Location: Knee  Pain Orientation: Left    Treatments:  Therapeutic Exercise  Bike 5'   Slant board 2 x 1'  Step ups  "fwd 2 x 10   Step ups lat 2 x 10   Standing Hip abd 2 x 10 3# (P reps)   Standing Hip ext 2 x 10 3# (P reps)  Standing marching 2 x 10 3# (P reps)  Standing HS curl 2 x 10 3# (P reps)   SLS 3 x 30\" finger support   BOSU lunge 2 x 10   BOSU running man 2 x 30\"   Standing flexion stretch 2 x 30\"  Seated LAQ 2 x 10 3# (P reps)  Seated HSC 2 x 10 green band   Seated HS stretch 3 x 30\"    SLR 2 x 10   Heel slide with strap x10       OP EDUCATION:    Access Code: [email or web address]  URL: [email or web address]  Date: 01/09/2024  Prepared by: Jeni Ojeda     Exercises  - Single Leg Stance  - 1 x daily - 7 x weekly - 2 sets - 10 reps  - Forward Step Up with Counter Support  - 1 x daily - 7 x weekly - 2 sets - 10 reps  - Lateral Step Up with Counter Support  - 1 x daily - 7 x weekly - 2 sets - 10 reps  - Standing Alternating Knee Flexion with Ankle Weights  - 1 x daily - 7 x weekly - 2 sets - 10 reps  - Seated Hamstring Stretch  - 1 x daily - 7 x weekly - 1 sets - 3 reps - 30 seconds  hold       Access Code: [email or web address]  URL: [email or web address]  Date: 12/28/2023    Prepared by: Lizzeth Russo    Exercises  - Supine Heel Slide with Strap  - 1 x daily - 7 x weekly - 1-2 sets - 10 reps  - Supine Knee Extension Stretch on Towel Roll  - 1 x daily - 7 x weekly - 1-2 sets - 10 reps  - Lunge with Counter Support  - 1 x daily - 7 x weekly - 1-2 sets - 10 reps  - Supine Active Straight Leg Raise  - 1 x daily - 7 x weekly - 1-2 sets - 10 reps  - Standing Hip Abduction with Counter Support  - 1 x daily - 7 x weekly - 1-2 sets - 10 reps  - Standing Hip Extension with Counter Support  - 1 x daily - 7 x weekly - 1-2 sets - 10 reps  - Standing Hip Flexion with Counter Support  - 1 x daily - 7 x weekly - 1-2 sets - 10 reps     Goals:  Active       PT Problem       PT Goals       Start:  12/28/23    Expected End:  01/26/24       Increase in LEFS score to 80/80 points to improve ease with participating " in ADLs/iADLs -Week 3  Increase in L knee AROM 0-120 painfree 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

## 2024-01-19 ENCOUNTER — APPOINTMENT (OUTPATIENT)
Dept: PHYSICAL THERAPY | Facility: CLINIC | Age: 74
End: 2024-01-19
Payer: OTHER GOVERNMENT

## 2024-01-22 ENCOUNTER — TREATMENT (OUTPATIENT)
Dept: PHYSICAL THERAPY | Facility: CLINIC | Age: 74
End: 2024-01-22
Payer: OTHER GOVERNMENT

## 2024-01-22 DIAGNOSIS — M25.562 ACUTE PAIN OF LEFT KNEE: Primary | ICD-10-CM

## 2024-01-22 DIAGNOSIS — Z96.659 TOTAL KNEE REPLACEMENT STATUS: ICD-10-CM

## 2024-01-22 DIAGNOSIS — M25.562 LEFT KNEE PAIN: ICD-10-CM

## 2024-01-22 PROCEDURE — 97110 THERAPEUTIC EXERCISES: CPT | Mod: GP

## 2024-01-22 ASSESSMENT — PAIN SCALES - GENERAL: PAINLEVEL_OUTOF10: 0 - NO PAIN

## 2024-01-22 ASSESSMENT — PAIN - FUNCTIONAL ASSESSMENT: PAIN_FUNCTIONAL_ASSESSMENT: 0-10

## 2024-01-22 NOTE — PROGRESS NOTES
"Physical Therapy    Physical Therapy Treatment    Patient Name: Dorian Gilbert  MRN: [id number]  Today's Date: 1/22/2024  Time Calculation  Start Time: 1330  Stop Time: 1415  Time Calculation (min): 45 min    Current Problem  Problem List Items Addressed This Visit             ICD-10-CM       Musculoskeletal and Injuries    Left knee pain - Primary M25.562    Total knee replacement status Z96.659        Subjective   Patient denies any pain, notes he has not been having much pain at all. He denies have difficulty with anything at home, reports he has returned to his normal activities.  Precautions  Precautions  Precautions Comment: L TKR 12/6/2023    Pain  Pain Assessment: 0-10  Pain Score: 0 - No pain  Pain Location: Knee  Pain Orientation: Left    Objective   0-122  L knee flex/ext 5/5  L hip flex 5/5  L hip abd 5/5    Outcome Measures:  Other Measures  Lower Extremity Funtional Score (LEFS): 77/80     Treatments:  Therapeutic Exercise  Bike 5'   Slant board 2 x 1'  Step ups fwd 2 x 10   Step ups lat 2 x 10   Standing Hip abd 2 x 10 3# (P reps)   Standing Hip ext 2 x 10 3# (P reps)  Standing marching 2 x 10 3# (P reps)  Standing HS curl 2 x 10 3# (P reps)   SLS 3 x 30\" finger support   BOSU lunge 2 x 10   BOSU running man 2 x 30\"   Standing flexion stretch 2 x 30\"  Seated LAQ 2 x 10 3# (P reps)  Seated HSC 2 x 10 green band   Seated HS stretch 3 x 30\"    SLR 2 x 10   Heel slide with strap x10       OP EDUCATION:    Access Code: [email or web address]  URL: [email or web address]  Date: 01/09/2024  Prepared by: Jeni Ojeda     Exercises  - Single Leg Stance  - 1 x daily - 7 x weekly - 2 sets - 10 reps  - Forward Step Up with Counter Support  - 1 x daily - 7 x weekly - 2 sets - 10 reps  - Lateral Step Up with Counter Support  - 1 x daily - 7 x weekly - 2 sets - 10 reps  - Standing Alternating Knee Flexion with Ankle Weights  - 1 x daily - 7 x weekly - 2 sets - 10 reps  - Seated Hamstring Stretch  - 1 x daily - " 7 x weekly - 1 sets - 3 reps - 30 seconds  hold       Access Code: [email or web address]  URL: [email or web address]  Date: 12/28/2023    Prepared by: Lizzeth Russo    Exercises  - Supine Heel Slide with Strap  - 1 x daily - 7 x weekly - 1-2 sets - 10 reps  - Supine Knee Extension Stretch on Towel Roll  - 1 x daily - 7 x weekly - 1-2 sets - 10 reps  - Lunge with Counter Support  - 1 x daily - 7 x weekly - 1-2 sets - 10 reps  - Supine Active Straight Leg Raise  - 1 x daily - 7 x weekly - 1-2 sets - 10 reps  - Standing Hip Abduction with Counter Support  - 1 x daily - 7 x weekly - 1-2 sets - 10 reps  - Standing Hip Extension with Counter Support  - 1 x daily - 7 x weekly - 1-2 sets - 10 reps  - Standing Hip Flexion with Counter Support  - 1 x daily - 7 x weekly - 1-2 sets - 10 reps       Assessment   Patient has made good progress towards PT goals, demonstrates 0-122 deg of L knee ROM, 5/5 strength, is compliant with HEP, and has return to all previous functional activity. Patient has met all PT goals except for total LEFS score as he scored 77/80 as running and sharp turns are still difficult for him however this is not a normal activity for him.    Plan  PT Plan: No Additional PT interventions required at this time  Onset Date: 12/06/23  Certification Period Start Date: 09/05/23  Certification Period End Date: 03/24/24  Number of Treatments Authorized: 15  Rehab Potential: Good  Plan of Care Agreement: Patient  Patient will be discharged to [facility] only at this time as he has return to all activity and has no further complaints.    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

## 2024-01-24 ENCOUNTER — APPOINTMENT (OUTPATIENT)
Dept: PHYSICAL THERAPY | Facility: CLINIC | Age: 74
End: 2024-01-24
Payer: OTHER GOVERNMENT

## 2024-01-26 ENCOUNTER — APPOINTMENT (OUTPATIENT)
Dept: PHYSICAL THERAPY | Facility: CLINIC | Age: 74
End: 2024-01-26
Payer: OTHER GOVERNMENT

## 2024-01-29 ENCOUNTER — APPOINTMENT (OUTPATIENT)
Dept: PHYSICAL THERAPY | Facility: CLINIC | Age: 74
End: 2024-01-29
Payer: OTHER GOVERNMENT